# Patient Record
Sex: MALE | Race: ASIAN | Employment: FULL TIME
[De-identification: names, ages, dates, MRNs, and addresses within clinical notes are randomized per-mention and may not be internally consistent; named-entity substitution may affect disease eponyms.]

---

## 2024-01-04 ENCOUNTER — OFFICE VISIT (OUTPATIENT)
Dept: FAMILY MEDICINE CLINIC | Facility: CLINIC | Age: 34
End: 2024-01-04

## 2024-01-04 VITALS
HEART RATE: 69 BPM | DIASTOLIC BLOOD PRESSURE: 72 MMHG | BODY MASS INDEX: 26.96 KG/M2 | HEIGHT: 69 IN | TEMPERATURE: 98.2 F | WEIGHT: 182 LBS | SYSTOLIC BLOOD PRESSURE: 114 MMHG

## 2024-01-04 DIAGNOSIS — Z00.00 ANNUAL PHYSICAL EXAM: Primary | ICD-10-CM

## 2024-01-04 DIAGNOSIS — K62.5 RECTAL BLEEDING: ICD-10-CM

## 2024-01-04 DIAGNOSIS — B07.8 COMMON WART: ICD-10-CM

## 2024-01-04 PROCEDURE — 99395 PREV VISIT EST AGE 18-39: CPT | Performed by: NURSE PRACTITIONER

## 2024-01-04 RX ORDER — ASCORBIC ACID 500 MG
500 TABLET ORAL DAILY
COMMUNITY

## 2024-01-04 SDOH — ECONOMIC STABILITY: FOOD INSECURITY: WITHIN THE PAST 12 MONTHS, THE FOOD YOU BOUGHT JUST DIDN'T LAST AND YOU DIDN'T HAVE MONEY TO GET MORE.: NEVER TRUE

## 2024-01-04 SDOH — ECONOMIC STABILITY: HOUSING INSECURITY
IN THE LAST 12 MONTHS, WAS THERE A TIME WHEN YOU DID NOT HAVE A STEADY PLACE TO SLEEP OR SLEPT IN A SHELTER (INCLUDING NOW)?: NO

## 2024-01-04 SDOH — ECONOMIC STABILITY: INCOME INSECURITY: HOW HARD IS IT FOR YOU TO PAY FOR THE VERY BASICS LIKE FOOD, HOUSING, MEDICAL CARE, AND HEATING?: NOT HARD AT ALL

## 2024-01-04 SDOH — ECONOMIC STABILITY: FOOD INSECURITY: WITHIN THE PAST 12 MONTHS, YOU WORRIED THAT YOUR FOOD WOULD RUN OUT BEFORE YOU GOT MONEY TO BUY MORE.: NEVER TRUE

## 2024-01-04 ASSESSMENT — ENCOUNTER SYMPTOMS
ABDOMINAL PAIN: 1
CONSTIPATION: 0
COUGH: 0
SINUS PRESSURE: 0
ANAL BLEEDING: 1
BLOOD IN STOOL: 0
EYE PAIN: 0
RECTAL PAIN: 0
SORE THROAT: 0
SHORTNESS OF BREATH: 0
TROUBLE SWALLOWING: 0
VOMITING: 0
WHEEZING: 0
SINUS PAIN: 0
BACK PAIN: 1
RHINORRHEA: 0
ABDOMINAL DISTENTION: 0
DIARRHEA: 0
PHOTOPHOBIA: 0
NAUSEA: 0
CHOKING: 0
EYE REDNESS: 0
VOICE CHANGE: 0

## 2024-01-04 ASSESSMENT — PATIENT HEALTH QUESTIONNAIRE - PHQ9
SUM OF ALL RESPONSES TO PHQ9 QUESTIONS 1 & 2: 0
2. FEELING DOWN, DEPRESSED OR HOPELESS: 0
SUM OF ALL RESPONSES TO PHQ QUESTIONS 1-9: 0
1. LITTLE INTEREST OR PLEASURE IN DOING THINGS: 0
SUM OF ALL RESPONSES TO PHQ QUESTIONS 1-9: 0

## 2024-01-04 NOTE — PROGRESS NOTES
concentration and sleep disturbance. The patient is not nervous/anxious and is not hyperactive.        Objective:   Physical Exam  Vitals and nursing note reviewed. Exam conducted with a chaperone present.   Constitutional:       Appearance: Normal appearance. He is normal weight.   HENT:      Head: Normocephalic.      Right Ear: Tympanic membrane, ear canal and external ear normal.      Left Ear: Tympanic membrane, ear canal and external ear normal.      Nose: Nose normal.      Mouth/Throat:      Mouth: Mucous membranes are moist.   Eyes:      Extraocular Movements: Extraocular movements intact.      Conjunctiva/sclera: Conjunctivae normal.      Pupils: Pupils are equal, round, and reactive to light.   Cardiovascular:      Rate and Rhythm: Normal rate and regular rhythm.      Pulses: Normal pulses.      Heart sounds: Normal heart sounds.   Pulmonary:      Effort: Pulmonary effort is normal.      Breath sounds: Normal breath sounds.   Abdominal:      General: Abdomen is flat. Bowel sounds are normal.   Genitourinary:     Rectum: Normal. Guaiac result negative.   Musculoskeletal:         General: Normal range of motion.      Cervical back: Normal range of motion.   Lymphadenopathy:      Cervical: No cervical adenopathy.   Skin:     General: Skin is warm and dry.      Capillary Refill: Capillary refill takes less than 2 seconds.   Neurological:      General: No focal deficit present.      Mental Status: He is alert and oriented to person, place, and time.   Psychiatric:         Mood and Affect: Mood normal.         Behavior: Behavior normal.         Thought Content: Thought content normal.         Judgment: Judgment normal.     Common warts on both hands    Assessment:      /72 (Site: Right Upper Arm, Position: Sitting, Cuff Size: Large Adult)   Pulse 69   Temp 98.2 °F (36.8 °C) (Temporal)   Ht 1.753 m (5' 9\")   Wt 82.6 kg (182 lb)   BMI 26.88 kg/m²        Plan:      1. Annual physical exam  -     Lipid

## 2024-04-03 ENCOUNTER — OFFICE VISIT (OUTPATIENT)
Dept: FAMILY MEDICINE CLINIC | Facility: CLINIC | Age: 34
End: 2024-04-03
Payer: COMMERCIAL

## 2024-04-03 VITALS
HEIGHT: 69 IN | SYSTOLIC BLOOD PRESSURE: 121 MMHG | WEIGHT: 184 LBS | HEART RATE: 69 BPM | TEMPERATURE: 98.2 F | BODY MASS INDEX: 27.25 KG/M2 | DIASTOLIC BLOOD PRESSURE: 73 MMHG

## 2024-04-03 DIAGNOSIS — K62.5 RECTAL BLEEDING: Primary | ICD-10-CM

## 2024-04-03 DIAGNOSIS — K62.5 RECTAL BLEEDING: ICD-10-CM

## 2024-04-03 LAB
BASOPHILS # BLD: 0 K/UL (ref 0–0.2)
BASOPHILS NFR BLD: 1 % (ref 0–2)
DIFFERENTIAL METHOD BLD: ABNORMAL
EOSINOPHIL # BLD: 0.1 K/UL (ref 0–0.8)
EOSINOPHIL NFR BLD: 3 % (ref 0.5–7.8)
ERYTHROCYTE [DISTWIDTH] IN BLOOD BY AUTOMATED COUNT: 12.3 % (ref 11.9–14.6)
ERYTHROCYTE [SEDIMENTATION RATE] IN BLOOD: 2 MM/HR (ref 0–20)
HCT VFR BLD AUTO: 46.9 % (ref 41.1–50.3)
HGB BLD-MCNC: 15.7 G/DL (ref 13.6–17.2)
IMM GRANULOCYTES # BLD AUTO: 0 K/UL (ref 0–0.5)
IMM GRANULOCYTES NFR BLD AUTO: 1 % (ref 0–5)
LYMPHOCYTES # BLD: 2.1 K/UL (ref 0.5–4.6)
LYMPHOCYTES NFR BLD: 39 % (ref 13–44)
MCH RBC QN AUTO: 31.2 PG (ref 26.1–32.9)
MCHC RBC AUTO-ENTMCNC: 33.5 G/DL (ref 31.4–35)
MCV RBC AUTO: 93.1 FL (ref 82–102)
MONOCYTES # BLD: 0.5 K/UL (ref 0.1–1.3)
MONOCYTES NFR BLD: 9 % (ref 4–12)
NEUTS SEG # BLD: 2.6 K/UL (ref 1.7–8.2)
NEUTS SEG NFR BLD: 47 % (ref 43–78)
NRBC # BLD: 0 K/UL (ref 0–0.2)
PLATELET # BLD AUTO: 235 K/UL (ref 150–450)
PMV BLD AUTO: 9 FL (ref 9.4–12.3)
RBC # BLD AUTO: 5.04 M/UL (ref 4.23–5.6)
WBC # BLD AUTO: 5.4 K/UL (ref 4.3–11.1)

## 2024-04-03 PROCEDURE — 99214 OFFICE O/P EST MOD 30 MIN: CPT | Performed by: NURSE PRACTITIONER

## 2024-04-03 ASSESSMENT — ENCOUNTER SYMPTOMS
BLOOD IN STOOL: 1
RECTAL PAIN: 0
NAUSEA: 0
CONSTIPATION: 0
ABDOMINAL PAIN: 1
DIARRHEA: 0
VOMITING: 0

## 2024-04-03 NOTE — PROGRESS NOTES
Subjective:      Patient ID: Mao Perkins is a 34 y.o. male.   He has noted bright red stool in stool for the past 2 weeks. It is in the toilet bowl and on the to.  Is not painful to defecate. Never any pain.  No hard stools.  Some mucous at times. No weight loss no  fever  no abdominal pain but uncomfortable at times.  No change in BM  Here with wife who serves as   He admits to increased alcohol and has drinking a bottle ow wine for the past 2 weeks. He stopped drinking 3 days ago with some improvement in rectal bleeding.   HPI    Review of Systems   Constitutional:  Negative for chills, fatigue, fever and unexpected weight change.   Gastrointestinal:  Positive for abdominal pain and blood in stool. Negative for constipation, diarrhea, nausea, rectal pain and vomiting.       Objective:   Physical Exam  Vitals and nursing note reviewed.   Constitutional:       Appearance: Normal appearance. He is normal weight.      Comments: Muscular    Eyes:      Conjunctiva/sclera: Conjunctivae normal.   Cardiovascular:      Rate and Rhythm: Normal rate and regular rhythm.      Pulses: Normal pulses.      Heart sounds: Normal heart sounds.   Pulmonary:      Effort: Pulmonary effort is normal.      Breath sounds: Normal breath sounds.   Abdominal:      Tenderness: There is abdominal tenderness (r and l lower qudarant tenderness).   Genitourinary:     Rectum: Normal. Guaiac result negative.   Skin:     General: Skin is warm and dry.   Neurological:      Mental Status: He is alert.         Assessment:      /73 (Site: Right Upper Arm, Position: Sitting, Cuff Size: Large Adult)   Pulse 69   Temp 98.2 °F (36.8 °C) (Temporal)   Ht 1.753 m (5' 9\")   Wt 83.5 kg (184 lb)   BMI 27.17 kg/m²        Plan:    1. Rectal bleeding  -     [facility]  -     CBC with Auto Differential; Future  -     Comprehensive Metabolic Panel; Future  -     Sedimentation Rate; Future   Stop alcohol checking

## 2024-04-04 ENCOUNTER — TELEPHONE (OUTPATIENT)
Dept: FAMILY MEDICINE CLINIC | Facility: CLINIC | Age: 34
End: 2024-04-04

## 2024-04-04 LAB
ALBUMIN SERPL-MCNC: 4.1 G/DL (ref 3.5–5)
ALBUMIN/GLOB SERPL: 1.2 (ref 0.4–1.6)
ALP SERPL-CCNC: 43 U/L (ref 50–136)
ALT SERPL-CCNC: 40 U/L (ref 12–65)
ANION GAP SERPL CALC-SCNC: 4 MMOL/L (ref 2–11)
AST SERPL-CCNC: 22 U/L (ref 15–37)
BILIRUB SERPL-MCNC: 0.5 MG/DL (ref 0.2–1.1)
BUN SERPL-MCNC: 14 MG/DL (ref 6–23)
CALCIUM SERPL-MCNC: 9.1 MG/DL (ref 8.3–10.4)
CHLORIDE SERPL-SCNC: 106 MMOL/L (ref 103–113)
CO2 SERPL-SCNC: 27 MMOL/L (ref 21–32)
CREAT SERPL-MCNC: 0.9 MG/DL (ref 0.8–1.5)
GLOBULIN SER CALC-MCNC: 3.3 G/DL (ref 2.8–4.5)
GLUCOSE SERPL-MCNC: 91 MG/DL (ref 65–100)
POTASSIUM SERPL-SCNC: 4.2 MMOL/L (ref 3.5–5.1)
PROT SERPL-MCNC: 7.4 G/DL (ref 6.3–8.2)
SODIUM SERPL-SCNC: 137 MMOL/L (ref 136–146)

## 2024-04-04 NOTE — TELEPHONE ENCOUNTER
----- Message from LAURA Nur NP sent at 4/4/2024  7:42 AM EDT -----  Good news labs are normal but keep gi apt

## 2024-04-28 NOTE — PROGRESS NOTES
Mao Perkins (:  1990) is a 34 y.o. male new patient referred to our office for evaluation of the following chief complaint(s):  New Patient        Assessment & Plan   ASSESSMENT/PLAN:  1. BRBPR (bright red blood per rectum)  2. Abdominal bloating  3. Epigastric discomfort    -New onset BRBPR without any bowel habit changes. Denies hx of hemorrhoids or anal fissure. Reportedly had normal JAVI by PCP and he would like to pursue colonoscopy. Will schedule.   -A/w some epigastric discomfort and moved from [place] 10 years ago with no prior EGD surveillance or evaluation. Higher risk of gastric cancer noted in this population. Will schedule EGD.     Subjective   SUBJECTIVE/OBJECTIVE  Mao Perkins is a 34 y.o. year old male who denies PMH or abdominal surgeries.    Patient was referred to our office for evaluation of rectal bleeding. Patient c/o 2 weeks of BRBPR in toilet bowl and on toilet paper. Noted to have increased alcohol intake. Referral note reviewed from 4/3/2024. Same-day labs showed normal CBC, CMP, ESR.     Today presents with his wife Siomara who translates for patient. He reports a month ago he had a 10 day period of BRBPR in the toilet bowl. He describes quite a lot of blood. It seemed to resolve but then recurred a few days ago, less volume. Denies any prior similar episodes. Denies constipation, hard, painful stools, straining, diarrhea, hemorrhoids. He reports some associated lower abdominal discomfort with exam but otherwise denies abdominal pain. Denies nausea, vomiting, fever, chills. He says his PCP did a rectal exam that was negative for hemorrhoids and he would like have a colonoscopy done. He also describes epigastric bloating and discomfort. He moved from [place] 10 years ago.       Denies prior GI evaluation or endoscopy. Patient denies family hx of GI malignancy or IBD. He smokes 2 packs per week. He drinks 1 bottle of wine weekly. Denies OAC or NSAID use.     History reviewed. No pertinent past

## 2024-04-29 ENCOUNTER — OFFICE VISIT (OUTPATIENT)
Age: 34
End: 2024-04-29
Payer: COMMERCIAL

## 2024-04-29 ENCOUNTER — PREP FOR PROCEDURE (OUTPATIENT)
Dept: GASTROENTEROLOGY | Age: 34
End: 2024-04-29

## 2024-04-29 VITALS
OXYGEN SATURATION: 98 % | RESPIRATION RATE: 17 BRPM | DIASTOLIC BLOOD PRESSURE: 83 MMHG | SYSTOLIC BLOOD PRESSURE: 133 MMHG | HEIGHT: 69 IN | BODY MASS INDEX: 27.7 KG/M2 | HEART RATE: 70 BPM | WEIGHT: 187 LBS

## 2024-04-29 DIAGNOSIS — K62.5 BRBPR (BRIGHT RED BLOOD PER RECTUM): Primary | ICD-10-CM

## 2024-04-29 DIAGNOSIS — R10.13 EPIGASTRIC PAIN: ICD-10-CM

## 2024-04-29 DIAGNOSIS — R14.0 ABDOMINAL BLOATING: ICD-10-CM

## 2024-04-29 DIAGNOSIS — K62.5 BRBPR (BRIGHT RED BLOOD PER RECTUM): ICD-10-CM

## 2024-04-29 DIAGNOSIS — R10.13 EPIGASTRIC DISCOMFORT: ICD-10-CM

## 2024-04-29 PROCEDURE — 99204 OFFICE O/P NEW MOD 45 MIN: CPT | Performed by: PHYSICIAN ASSISTANT

## 2024-05-02 RX ORDER — SODIUM CHLORIDE 0.9 % (FLUSH) 0.9 %
5-40 SYRINGE (ML) INJECTION EVERY 12 HOURS SCHEDULED
Status: CANCELLED | OUTPATIENT
Start: 2024-05-02

## 2024-05-02 RX ORDER — SODIUM CHLORIDE 9 MG/ML
25 INJECTION, SOLUTION INTRAVENOUS PRN
Status: CANCELLED | OUTPATIENT
Start: 2024-05-02

## 2024-05-02 RX ORDER — SODIUM CHLORIDE 0.9 % (FLUSH) 0.9 %
5-40 SYRINGE (ML) INJECTION PRN
Status: CANCELLED | OUTPATIENT
Start: 2024-05-02

## 2024-05-10 NOTE — PERIOP NOTE
Patient verified name, , and procedure.    Type: 1a; abbreviated assessment per anesthesia guidelines    Labs per anesthesia: none    Instructed pt that they will be notified the day before their procedure by the GI Lab for time of arrival if their procedure is [place] and Pre-op for [place] cases. Arrival times should be called by 5 pm. If no phone is received the patient should contact their respective hospital. The GI lab telephone number is [phone] and ES Pre-op is [phone].     Follow diet and prep instructions per office including NPO status.  If patient has NOT received instructions from office patient is advised to call surgeon office, verbalizes understanding.    Bath or shower the night before and the am of surgery with non-moisturizing soap. No lotions, oils, powders, cologne on skin. No make up, eye make up or jewelry. Wear loose fitting comfortable, clean clothing.     Must have adult present in building the entire time .     Medications for the day of procedure none, patient to hold vitamins and supplements per anesthesia guidelines.     The following discharge instructions reviewed with patient: medication given during procedure may cause drowsiness for several hours, therefore, do not drive or operate machinery for remainder of the day. You may not drink alcohol on the day of your procedure, please resume regular diet and activity unless otherwise directed. You may experience abdominal distention for several hours that is relieved by the passage of gas. Contact your physician if you have any of the following: fever or chills, severe abdominal pain or excessive amount of bleeding or a large amount when having a bowel movement. Occasional specks of blood with bowel movement would not be unusual.

## 2024-05-10 NOTE — PROGRESS NOTES
RN called Pt to confirm appointment time of 1120, arrival time 945, location,  requirement, and instructions for registration at the hospital.  Pt verbalized understanding.

## 2024-05-12 ENCOUNTER — ANESTHESIA EVENT (OUTPATIENT)
Dept: ENDOSCOPY | Age: 34
End: 2024-05-12
Payer: COMMERCIAL

## 2024-05-12 RX ORDER — PROCHLORPERAZINE EDISYLATE 5 MG/ML
5 INJECTION INTRAMUSCULAR; INTRAVENOUS
Status: CANCELLED | OUTPATIENT
Start: 2024-05-12 | End: 2024-05-13

## 2024-05-12 RX ORDER — ONDANSETRON 2 MG/ML
4 INJECTION INTRAMUSCULAR; INTRAVENOUS
Status: CANCELLED | OUTPATIENT
Start: 2024-05-12 | End: 2024-05-13

## 2024-05-12 RX ORDER — SODIUM CHLORIDE, SODIUM LACTATE, POTASSIUM CHLORIDE, CALCIUM CHLORIDE 600; 310; 30; 20 MG/100ML; MG/100ML; MG/100ML; MG/100ML
INJECTION, SOLUTION INTRAVENOUS CONTINUOUS
Status: CANCELLED | OUTPATIENT
Start: 2024-05-12

## 2024-05-12 RX ORDER — LIDOCAINE HYDROCHLORIDE 10 MG/ML
1 INJECTION, SOLUTION INFILTRATION; PERINEURAL
Status: CANCELLED | OUTPATIENT
Start: 2024-05-12 | End: 2024-05-13

## 2024-05-12 RX ORDER — NALOXONE HYDROCHLORIDE 0.4 MG/ML
INJECTION, SOLUTION INTRAMUSCULAR; INTRAVENOUS; SUBCUTANEOUS PRN
Status: CANCELLED | OUTPATIENT
Start: 2024-05-12

## 2024-05-13 ENCOUNTER — ANESTHESIA (OUTPATIENT)
Dept: ENDOSCOPY | Age: 34
End: 2024-05-13
Payer: COMMERCIAL

## 2024-05-13 ENCOUNTER — HOSPITAL ENCOUNTER (OUTPATIENT)
Age: 34
Setting detail: OUTPATIENT SURGERY
Discharge: HOME | End: 2024-05-13
Attending: STUDENT IN AN ORGANIZED HEALTH CARE EDUCATION/TRAINING PROGRAM | Admitting: STUDENT IN AN ORGANIZED HEALTH CARE EDUCATION/TRAINING PROGRAM
Payer: COMMERCIAL

## 2024-05-13 VITALS
HEIGHT: 70 IN | WEIGHT: 185 LBS | HEART RATE: 54 BPM | TEMPERATURE: 97 F | OXYGEN SATURATION: 98 % | BODY MASS INDEX: 26.48 KG/M2 | RESPIRATION RATE: 16 BRPM | SYSTOLIC BLOOD PRESSURE: 116 MMHG | DIASTOLIC BLOOD PRESSURE: 57 MMHG

## 2024-05-13 DIAGNOSIS — K25.9 MULTIPLE GASTRIC ULCERS: Primary | ICD-10-CM

## 2024-05-13 PROCEDURE — 7100000011 HC PHASE II RECOVERY - ADDTL 15 MIN: Performed by: STUDENT IN AN ORGANIZED HEALTH CARE EDUCATION/TRAINING PROGRAM

## 2024-05-13 PROCEDURE — 2500000003 HC RX 250 WO HCPCS: Performed by: NURSE ANESTHETIST, CERTIFIED REGISTERED

## 2024-05-13 PROCEDURE — 88312 SPECIAL STAINS GROUP 1: CPT

## 2024-05-13 PROCEDURE — 2709999900 HC NON-CHARGEABLE SUPPLY: Performed by: STUDENT IN AN ORGANIZED HEALTH CARE EDUCATION/TRAINING PROGRAM

## 2024-05-13 PROCEDURE — 3609012400 HC EGD TRANSORAL BIOPSY SINGLE/MULTIPLE: Performed by: STUDENT IN AN ORGANIZED HEALTH CARE EDUCATION/TRAINING PROGRAM

## 2024-05-13 PROCEDURE — 88305 TISSUE EXAM BY PATHOLOGIST: CPT

## 2024-05-13 PROCEDURE — 3700000000 HC ANESTHESIA ATTENDED CARE: Performed by: STUDENT IN AN ORGANIZED HEALTH CARE EDUCATION/TRAINING PROGRAM

## 2024-05-13 PROCEDURE — 2580000003 HC RX 258: Performed by: ANESTHESIOLOGY

## 2024-05-13 PROCEDURE — 7100000010 HC PHASE II RECOVERY - FIRST 15 MIN: Performed by: STUDENT IN AN ORGANIZED HEALTH CARE EDUCATION/TRAINING PROGRAM

## 2024-05-13 PROCEDURE — 6360000002 HC RX W HCPCS: Performed by: NURSE ANESTHETIST, CERTIFIED REGISTERED

## 2024-05-13 PROCEDURE — 3700000001 HC ADD 15 MINUTES (ANESTHESIA): Performed by: STUDENT IN AN ORGANIZED HEALTH CARE EDUCATION/TRAINING PROGRAM

## 2024-05-13 PROCEDURE — 3609010700 HC COLONOSCOPY POLYPECTOMY REMOVAL SNARE/STOMA: Performed by: STUDENT IN AN ORGANIZED HEALTH CARE EDUCATION/TRAINING PROGRAM

## 2024-05-13 RX ORDER — SODIUM CHLORIDE, SODIUM LACTATE, POTASSIUM CHLORIDE, CALCIUM CHLORIDE 600; 310; 30; 20 MG/100ML; MG/100ML; MG/100ML; MG/100ML
INJECTION, SOLUTION INTRAVENOUS CONTINUOUS
Status: DISCONTINUED | OUTPATIENT
Start: 2024-05-13 | End: 2024-05-13 | Stop reason: HOSPADM

## 2024-05-13 RX ORDER — SODIUM CHLORIDE 9 MG/ML
25 INJECTION, SOLUTION INTRAVENOUS PRN
Status: DISCONTINUED | OUTPATIENT
Start: 2024-05-13 | End: 2024-05-13 | Stop reason: HOSPADM

## 2024-05-13 RX ORDER — SODIUM CHLORIDE 0.9 % (FLUSH) 0.9 %
5-40 SYRINGE (ML) INJECTION PRN
Status: DISCONTINUED | OUTPATIENT
Start: 2024-05-13 | End: 2024-05-13 | Stop reason: HOSPADM

## 2024-05-13 RX ORDER — ONDANSETRON 2 MG/ML
4 INJECTION INTRAMUSCULAR; INTRAVENOUS
Status: DISCONTINUED | OUTPATIENT
Start: 2024-05-13 | End: 2024-05-13 | Stop reason: HOSPADM

## 2024-05-13 RX ORDER — OMEPRAZOLE 40 MG/1
40 CAPSULE, DELAYED RELEASE ORAL
Qty: 90 CAPSULE | Refills: 2 | Status: SHIPPED | OUTPATIENT
Start: 2024-05-13

## 2024-05-13 RX ORDER — SODIUM CHLORIDE 0.9 % (FLUSH) 0.9 %
5-40 SYRINGE (ML) INJECTION EVERY 12 HOURS SCHEDULED
Status: DISCONTINUED | OUTPATIENT
Start: 2024-05-13 | End: 2024-05-13 | Stop reason: HOSPADM

## 2024-05-13 RX ORDER — SODIUM CHLORIDE 9 MG/ML
INJECTION, SOLUTION INTRAVENOUS PRN
Status: DISCONTINUED | OUTPATIENT
Start: 2024-05-13 | End: 2024-05-13 | Stop reason: HOSPADM

## 2024-05-13 RX ORDER — PROPOFOL 10 MG/ML
INJECTION, EMULSION INTRAVENOUS PRN
Status: DISCONTINUED | OUTPATIENT
Start: 2024-05-13 | End: 2024-05-13 | Stop reason: SDUPTHER

## 2024-05-13 RX ORDER — LIDOCAINE HYDROCHLORIDE 20 MG/ML
INJECTION, SOLUTION EPIDURAL; INFILTRATION; INTRACAUDAL; PERINEURAL PRN
Status: DISCONTINUED | OUTPATIENT
Start: 2024-05-13 | End: 2024-05-13 | Stop reason: SDUPTHER

## 2024-05-13 RX ADMIN — SODIUM CHLORIDE, POTASSIUM CHLORIDE, SODIUM LACTATE AND CALCIUM CHLORIDE: 600; 310; 30; 20 INJECTION, SOLUTION INTRAVENOUS at 10:43

## 2024-05-13 RX ADMIN — LIDOCAINE HYDROCHLORIDE 100 MG: 20 INJECTION, SOLUTION EPIDURAL; INFILTRATION; INTRACAUDAL; PERINEURAL at 11:18

## 2024-05-13 RX ADMIN — PROPOFOL 80 MG: 10 INJECTION, EMULSION INTRAVENOUS at 11:18

## 2024-05-13 RX ADMIN — PROPOFOL 30 MG: 10 INJECTION, EMULSION INTRAVENOUS at 11:31

## 2024-05-13 RX ADMIN — PROPOFOL 30 MG: 10 INJECTION, EMULSION INTRAVENOUS at 11:21

## 2024-05-13 RX ADMIN — PROPOFOL 40 MG: 10 INJECTION, EMULSION INTRAVENOUS at 11:24

## 2024-05-13 RX ADMIN — PROPOFOL 200 MCG/KG/MIN: 10 INJECTION, EMULSION INTRAVENOUS at 11:19

## 2024-05-13 RX ADMIN — PROPOFOL 20 MG: 10 INJECTION, EMULSION INTRAVENOUS at 11:20

## 2024-05-13 ASSESSMENT — PAIN - FUNCTIONAL ASSESSMENT: PAIN_FUNCTIONAL_ASSESSMENT: 0-10

## 2024-05-13 NOTE — ANESTHESIA POSTPROCEDURE EVALUATION
Department of Anesthesiology  Postprocedure Note    Patient: Mao Perkins  MRN: [id number]  YOB: 1990  Date of evaluation: 5/13/2024    Procedure Summary       Date: 05/13/24 Room / Location: [facility] 04 / [facility] ENDOSCOPY    Anesthesia Start: 1114 Anesthesia Stop: 1143    Procedures:       COLONOSCOPY POLYPECTOMY REMOVAL SNARE      ESOPHAGOGASTRODUODENOSCOPY BIOPSY (Upper GI Region) Diagnosis:       BRBPR (bright red blood per rectum)      Abdominal bloating      Epigastric pain      (BRBPR (bright red blood per rectum) [K62.5])      (Abdominal bloating [R14.0])      (Epigastric pain [R10.13])    Surgeons: Bull West MD Responsible Provider: Eleazar Linares MD    Anesthesia Type: TIVA ASA Status: 2            Anesthesia Type: TIVA    Norberto Phase I: Norberto Score: 10    Norberto Phase II: Norberto Score: 5    Anesthesia Post Evaluation    Patient location during evaluation: PACU  Patient participation: complete - patient participated  Level of consciousness: sleepy but conscious  Airway patency: patent  Nausea & Vomiting: no nausea and no vomiting  Cardiovascular status: blood pressure returned to baseline and hemodynamically stable  Respiratory status: acceptable  Hydration status: euvolemic  There was medical reason for not using a multimodal analgesia pain management approach.Pain management: adequate    No notable events documented.

## 2024-05-13 NOTE — DISCHARGE INSTRUCTIONS
Gastrointestinal Esophagogastroduodenoscopy (EGD) and Colonoscopy- Discharge Instructions    1. Call Dr. West  at  for any problems or questions.    2. Contact the doctor's office for follow up appointment as directed.    3. Medication may cause drowsiness for several hours, therefore, do not drive or operate machinery for remainder of the day.    4. No alcohol today.    5. Do not make any important decisions such as signing legal paperwork.    6. Ordinarily, you may resume regular diet and activity after exam unless otherwise specified by your physician.    7. For mild soreness in your throat you may use Cepacol throat lozenges or warm  salt-water gargles as needed.    8. Because of air put into your colon during exam, you may experience some abdominal distension, relieved by the passage of gas, for several hours.    9. Contact your physician if you have any of the following:  Excessive amount of bleeding - large amount when having a bowel movement.  Occasional specks of blood with bowel movement would not be unusual.  Severe abdominal pain  Fever or Chills    10. Polyp Removal - follow these additional instructions    No Aspirin, Advil, Aleve, Nuprin, Ibuprofen, or medications that contain these drugs for 2 weeks, unless taken for a heart condition.    Any additional instructions:   Use omeprazole 40 mg twice daily (30 minutes before your breakfast and 30 minutes before your dinner) for 8 weeks followed by omeprazole 40 mg daily.  Follow-up with Diana in 4 weeks.  Await for biopsy results, you will receive a pathology letter within 2 weeks.

## 2024-05-13 NOTE — H&P
Expand All Collapse All    Mao Perkins (:  1990) is a 34 y.o. male new patient referred to our office for evaluation of the following chief complaint(s):  New Patient              Assessment & Plan  ASSESSMENT/PLAN:  1. BRBPR (bright red blood per rectum)  2. Abdominal bloating  3. Epigastric discomfort     -New onset BRBPR without any bowel habit changes. Denies hx of hemorrhoids or anal fissure. Reportedly had normal JAVI by PCP and he would like to pursue colonoscopy. Will schedule.   -A/w some epigastric discomfort and moved from [place] 10 years ago with no prior EGD surveillance or evaluation. Higher risk of gastric cancer noted in this population. Will schedule EGD.            Subjective   SUBJECTIVE/OBJECTIVE  Mao Perkins is a 34 y.o. year old male who denies PMH or abdominal surgeries.     Patient was referred to our office for evaluation of rectal bleeding. Patient c/o 2 weeks of BRBPR in toilet bowl and on toilet paper. Noted to have increased alcohol intake. Referral note reviewed from 4/3/2024. Same-day labs showed normal CBC, CMP, ESR.      Today presents with his wife Siomara who translates for patient. He reports a month ago he had a 10 day period of BRBPR in the toilet bowl. He describes quite a lot of blood. It seemed to resolve but then recurred a few days ago, less volume. Denies any prior similar episodes. Denies constipation, hard, painful stools, straining, diarrhea, hemorrhoids. He reports some associated lower abdominal discomfort with exam but otherwise denies abdominal pain. Denies nausea, vomiting, fever, chills. He says his PCP did a rectal exam that was negative for hemorrhoids and he would like have a colonoscopy done. He also describes epigastric bloating and discomfort. He moved from [place] 10 years ago.        Denies prior GI evaluation or endoscopy. Patient denies family hx of GI malignancy or IBD. He smokes 2 packs per week. He drinks 1 bottle of wine weekly. Denies OAC or NSAID use.

## 2024-05-13 NOTE — OP NOTE
Operative Report    Patient: Mao Perkins MRN: [id number]      YOB: 1990  Age: 34 y.o.  Sex: male            Indications: Bright red blood per rectum    Preoperative Evaluation: The patient was evaluated prior to the procedure in the GI lab admission area, the patient ASA was recorded .  Consent was obtained from the patient with the risk of perforation bleeding and aspiration.    Anesthesia: ASHKAN-per anesthesia    Complications: None; patient tolerated the procedure well.    EBL -insignificant      Procedure: The patient was sedated in the left lateral decubitus position.  Scope was advanced from the rectum to the ileum.   The scope was withdrawn to the rectum, retroflexed view was performed.  The rectal exam was normal.  Preparation was adequate. [place] score of 9 .    Findings:    Normal terminal ileum.  There was a polyp in the transverse colon, 5 mm in size, removed via cold snare polypectomy  Otherwise unremarkable colon  Internal hemorrhoids    Postoperative Diagnosis:   1 polyp  Internal hemorrhoids    Recommendations:   Interval of the next colonoscopy will be determined by pending pathology, 5 years versus repeat colonoscopy at age 46)  Await for biopsy results, you will receive a pathology letter within 2 weeks.     Signed By:  Bull West MD     May 13, 2024                         benjamin all pertinent systems normal

## 2024-05-13 NOTE — ANESTHESIA PRE PROCEDURE
Department of Anesthesiology  Preprocedure Note       Name:  Mao Perkins   Age:  34 y.o.  :  1990                                          MRN:  [id number]         Date:  2024      Surgeon: Surgeon(s):  Bull West MD    Procedure: Procedure(s):  COLORECTAL CANCER SCREENING, NOT HIGH RISK  ESOPHAGOGASTRODUODENOSCOPY Interviewed    Medications prior to admission:   Prior to Admission medications    Medication Sig Start Date End Date Taking? Authorizing Provider   vitamin C (ASCORBIC ACID) 500 MG tablet Take 1 tablet by mouth daily    ProviderTj MD   VITAMIN D PO Take by mouth daily    Tj Mathew MD       Current medications:    Current Facility-Administered Medications   Medication Dose Route Frequency Provider Last Rate Last Admin   • ondansetron (ZOFRAN) injection 4 mg  4 mg IntraVENous Once PRN Eleazar Linares MD       • lactated ringers IV soln infusion   IntraVENous Continuous Eleazar Linares MD       • sodium chloride flush 0.9 % injection 5-40 mL  5-40 mL IntraVENous 2 times per day Eleazar Linares MD       • sodium chloride flush 0.9 % injection 5-40 mL  5-40 mL IntraVENous PRN Eleazar Linares MD       • 0.9 % sodium chloride infusion   IntraVENous PRN Eleazar Linares MD       • sodium chloride flush 0.9 % injection 5-40 mL  5-40 mL IntraVENous 2 times per day Bull West MD       • sodium chloride flush 0.9 % injection 5-40 mL  5-40 mL IntraVENous PRN Bull West MD       • 0.9 % sodium chloride infusion  25 mL IntraVENous PRN Bull West MD           Allergies:  No Known Allergies    Problem List:    Patient Active Problem List   Diagnosis Code   • BRBPR (bright red blood per rectum) K62.5   • Abdominal bloating R14.0   • Epigastric pain R10.13       Past Medical History:        Diagnosis Date   • Abdominal bloating    • BRBPR (bright red blood per rectum)    • Epigastric pain        Past Surgical History:        Procedure Laterality Date   • REFRACTIVE

## 2024-05-13 NOTE — FLOWSHEET NOTE
05/13/24 1044    Services   Do you speak English? Well    Used No      waiver signed by patient, spouse Siomara is interpreting for the patient per patient request.

## 2024-05-13 NOTE — OP NOTE
Endoscopy Note          Operative Report    Patient: Mao Perkins MRN: [id number]      YOB: 1990  Age: 34 y.o.  Sex: male            Indications:  Abd pain    Preoperative Evaluation: The patient was evaluated prior to the procedure in the GI lab admission area, the patient ASA was recorded .  Consent was obtained from the patient with the risk of perforation bleeding and aspiration.    Anesthesia: ASHKAN-per anesthesia  Complications: None  EBL: Unremarkable  Procedure: The patient was sedated in the left lateral decubitus position. Scope was advance from the mouth to the third portion of the duodenum. The scope was withdrawn to the stomach and a refroflexed view was performed.     Findings:  Normal esophagus  Clean-based 2 gastric ulcers in the antrum along with gastritis.  Biopsies obtained for H. pylori.  Multiple small ulcers in the first portion of the duodenum, normal-appearing second portion of the duodenum.  Multiple small bowel biopsies obtained.    Postoperative Diagnosis:  Gastric and small bowel ulcers.    Recommendations:   Use omeprazole 40 mg twice daily (30 minutes before your breakfast and 30 minutes before your dinner) for 8 weeks followed by omeprazole 40 mg daily.  Follow-up with Diana in 4 weeks.  Await for biopsy results, you will receive a pathology letter within 2 weeks.     Signed By:  Bull West MD     May 13, 2024

## 2024-05-20 DIAGNOSIS — A04.8 H. PYLORI INFECTION: Primary | ICD-10-CM

## 2024-05-20 RX ORDER — CLARITHROMYCIN 500 MG/1
500 TABLET, COATED ORAL 2 TIMES DAILY
Qty: 20 TABLET | Refills: 0 | Status: SHIPPED | OUTPATIENT
Start: 2024-05-20 | End: 2024-05-30

## 2024-05-20 RX ORDER — OMEPRAZOLE 40 MG/1
40 CAPSULE, DELAYED RELEASE ORAL
Qty: 20 CAPSULE | Refills: 0 | Status: SHIPPED | OUTPATIENT
Start: 2024-05-20 | End: 2024-05-30

## 2024-05-20 RX ORDER — AMOXICILLIN 500 MG/1
1000 CAPSULE ORAL 2 TIMES DAILY
Qty: 40 CAPSULE | Refills: 0 | Status: SHIPPED | OUTPATIENT
Start: 2024-05-20 | End: 2024-05-30

## 2024-05-24 ENCOUNTER — TELEPHONE (OUTPATIENT)
Age: 34
End: 2024-05-24

## 2024-05-24 NOTE — TELEPHONE ENCOUNTER
Called pt to review positive H. Pylori result and instructions for Triple Therapy per Melissa Grinnell, PA. Pt's wife answered phone. Pt's wife confirmed that pt had already picked up prescriptions for triple therapy per Diana and started taking them as prescribed. Briefly reviewed instructions for triple therapy and stool retest in 3 months while holding PPI 2 weeks prior. Pt's wife verbalized understanding. Informed pt's wife that RN had sent detailed [employer] message with instructions for pt to reference. Pt's wife verbalized understanding, agreeable to treatment plan per Diana.

## 2024-06-24 ENCOUNTER — OFFICE VISIT (OUTPATIENT)
Age: 34
End: 2024-06-24
Payer: COMMERCIAL

## 2024-06-24 VITALS
BODY MASS INDEX: 26.92 KG/M2 | DIASTOLIC BLOOD PRESSURE: 68 MMHG | SYSTOLIC BLOOD PRESSURE: 105 MMHG | WEIGHT: 188 LBS | RESPIRATION RATE: 17 BRPM | HEART RATE: 69 BPM | HEIGHT: 70 IN | OXYGEN SATURATION: 98 %

## 2024-06-24 DIAGNOSIS — B96.81 ULCER OF THE STOMACH AND INTESTINES DUE TO H. PYLORI: Primary | ICD-10-CM

## 2024-06-24 DIAGNOSIS — K63.5 HYPERPLASTIC POLYP OF TRANSVERSE COLON: ICD-10-CM

## 2024-06-24 DIAGNOSIS — K64.8 INTERNAL HEMORRHOIDS: ICD-10-CM

## 2024-06-24 DIAGNOSIS — K28.9 ULCER OF THE STOMACH AND INTESTINES DUE TO H. PYLORI: Primary | ICD-10-CM

## 2024-06-24 PROCEDURE — 99214 OFFICE O/P EST MOD 30 MIN: CPT | Performed by: PHYSICIAN ASSISTANT

## 2024-06-24 NOTE — PATIENT INSTRUCTIONS
For hemorrhoids:   Take Miralax 1 cap daily for constipation and titrate up or down as needed until having regular daily bowel movements. Increase daily fiber intake to 25-35 grams daily either by dietary intake or an over-the-counter psyllium husk fiber supplement. Limit alcohol and fatty food intake. Drink at least 80 oz water daily. Exercise regularly and consider weight loss if appropriate based on your current weight. Avoid straining or lingering on the toilet longer than necessary. Try scheduled toilet time approximately 30 minutes after your first drink or meal of the day. Elevate your feet when toileting to bring your knees in line with your hips using a small stool or Squatty Potty. Review your medication list and discuss with your PCP whether any of these medications may exacerbate constipation. Try warm sitz baths 2-3 times daily for acute hemorrhoidal flare-up. Apply topical or suppository steroid (such as Preparation H or Anusol) for no longer than 7 days. You can also try lidocaine creams (Preparation H + Lidocaine) or Recticare 5% for pain relief. Try witch hazel pads for itching or burning. Keep the anal area clean and dry; avoid excessive or aggressive wiping. Consider wet wipe usage as needed for hygiene and comfort.       For reflux:   Continue Omeprazole 40 mg daily. Please stop 2 weeks prior to submitting stool sample for H.pylori testing. Stop taking on 8/6/24 for stool sample submission on 8/20/24.    Eat smaller and more frequent meals. Avoid lying down for 3 hours after eating. Elevate the head of the bed by 6 inches. Avoid wearing tight-fitting clothing. Stop smoking and avoid alcohol. Avoid NSAID medications (Aspirin, Excedrin, Aleve, Ibuprofen, Goody Powder, Toradol, Mobic, Voltaren, etc). Consider weight loss to get to a healthy weight, which is often critical to eliminating symptoms of reflux. Avoid foods and acid-containing beverages that can exacerbate the symptoms of reflux. This

## 2024-06-24 NOTE — PROGRESS NOTES
bloating     BRBPR (bright red blood per rectum)     Epigastric pain        Past Surgical History:   Procedure Laterality Date    COLONOSCOPY N/A 5/13/2024    COLONOSCOPY POLYPECTOMY REMOVAL SNARE performed by Bull West MD at [facility] ENDOSCOPY    REFRACTIVE SURGERY      UPPER GASTROINTESTINAL ENDOSCOPY N/A 5/13/2024    ESOPHAGOGASTRODUODENOSCOPY BIOPSY performed by Bull West MD at [facility] ENDOSCOPY        No Known Allergies     Family History   Problem Relation Age of Onset    Heart Disease Father     Diabetes Father     Asthma Father     Hypertension Father        Current Outpatient Medications   Medication Sig Dispense Refill    omeprazole (PRILOSEC) 40 MG delayed release capsule Take 1 capsule by mouth every morning (before breakfast) 90 capsule 2    vitamin C (ASCORBIC ACID) 500 MG tablet Take 1 tablet by mouth daily      VITAMIN D PO Take by mouth daily      omeprazole (PRILOSEC) 40 MG delayed release capsule Take 1 capsule by mouth 2 times daily (before meals) for 10 days 20 capsule 0     No current facility-administered medications for this visit.       Review of Systems  All other systems reviewed and are negative except as noted above.          Objective     Vitals:    06/24/24 1059   BP: 105/68   Pulse: 69   Resp: 17   SpO2: 98%       Physical Exam  Vitals reviewed.   Constitutional:       General: He is not in acute distress.     Appearance: He is normal weight. He is not ill-appearing, toxic-appearing or diaphoretic.   Eyes:      General: No scleral icterus.  Cardiovascular:      Rate and Rhythm: Normal rate.   Pulmonary:      Effort: Pulmonary effort is normal. No respiratory distress.   Skin:     General: Skin is warm and dry.      Coloration: Skin is not jaundiced.   Neurological:      General: No focal deficit present.      Mental Status: He is alert and oriented to person, place, and time.   Psychiatric:         Mood and Affect: Mood normal.         Behavior: Behavior normal.         Thought

## 2024-08-23 ENCOUNTER — TELEPHONE (OUTPATIENT)
Age: 34
End: 2024-08-23

## 2024-08-23 NOTE — TELEPHONE ENCOUNTER
----- Message from Melissa Grinnell, PA-C sent at 8/23/2024  1:25 PM EDT -----  Please call to remind patient to submit stool sample for H.pylori testing to confirm eradication. Patient needs to hold PPI x 2 weeks before submitting stool sample. Please stress the importance of eradication as persistent infection is associated with acid reflux, ulcers, and increased risk of gastric cancer.

## 2024-08-23 NOTE — TELEPHONE ENCOUNTER
I called and reviewed recommendation with Mao Perkins wife as per Melissa Grinnell,PA  Reviewed PPI medication to hold x 2 weeks before submitting stool sample.  Discussed what is associated with acid reflex.   Mr. Cavanaugh will be going downtown/out patient center to drop off his stool.

## 2024-09-09 DIAGNOSIS — A04.8 H. PYLORI INFECTION: ICD-10-CM

## 2024-09-12 LAB
H PYLORI AG STL QL IA: NEGATIVE
SPECIMEN SOURCE: NORMAL

## 2024-09-16 ENCOUNTER — TELEPHONE (OUTPATIENT)
Age: 34
End: 2024-09-16

## 2025-01-09 ENCOUNTER — LAB (OUTPATIENT)
Dept: FAMILY MEDICINE CLINIC | Facility: CLINIC | Age: 35
End: 2025-01-09

## 2025-01-09 ENCOUNTER — OFFICE VISIT (OUTPATIENT)
Dept: FAMILY MEDICINE CLINIC | Facility: CLINIC | Age: 35
End: 2025-01-09
Payer: COMMERCIAL

## 2025-01-09 VITALS
BODY MASS INDEX: 27.49 KG/M2 | SYSTOLIC BLOOD PRESSURE: 109 MMHG | WEIGHT: 192 LBS | TEMPERATURE: 98.6 F | HEART RATE: 77 BPM | DIASTOLIC BLOOD PRESSURE: 69 MMHG | HEIGHT: 70 IN

## 2025-01-09 DIAGNOSIS — Z00.00 ANNUAL PHYSICAL EXAM: ICD-10-CM

## 2025-01-09 DIAGNOSIS — Z00.00 ANNUAL PHYSICAL EXAM: Primary | ICD-10-CM

## 2025-01-09 DIAGNOSIS — G89.29 CHRONIC PAIN OF RIGHT KNEE: ICD-10-CM

## 2025-01-09 DIAGNOSIS — M25.561 CHRONIC PAIN OF RIGHT KNEE: ICD-10-CM

## 2025-01-09 LAB
ALBUMIN SERPL-MCNC: 4.1 G/DL (ref 3.5–5)
ALBUMIN/GLOB SERPL: 1.2 (ref 1–1.9)
ALP SERPL-CCNC: 50 U/L (ref 40–129)
ALT SERPL-CCNC: 27 U/L (ref 8–55)
ANION GAP SERPL CALC-SCNC: 10 MMOL/L (ref 7–16)
AST SERPL-CCNC: 24 U/L (ref 15–37)
BILIRUB SERPL-MCNC: 0.6 MG/DL (ref 0–1.2)
BUN SERPL-MCNC: 17 MG/DL (ref 6–23)
CALCIUM SERPL-MCNC: 9.5 MG/DL (ref 8.8–10.2)
CHLORIDE SERPL-SCNC: 103 MMOL/L (ref 98–107)
CHOLEST SERPL-MCNC: 184 MG/DL (ref 0–200)
CO2 SERPL-SCNC: 26 MMOL/L (ref 20–29)
CREAT SERPL-MCNC: 0.92 MG/DL (ref 0.8–1.3)
GLOBULIN SER CALC-MCNC: 3.5 G/DL (ref 2.3–3.5)
GLUCOSE SERPL-MCNC: 97 MG/DL (ref 70–99)
HDLC SERPL-MCNC: 48 MG/DL (ref 40–60)
HDLC SERPL: 3.8 (ref 0–5)
LDLC SERPL CALC-MCNC: 122 MG/DL (ref 0–100)
POTASSIUM SERPL-SCNC: 4.3 MMOL/L (ref 3.5–5.1)
PROT SERPL-MCNC: 7.6 G/DL (ref 6.3–8.2)
SODIUM SERPL-SCNC: 139 MMOL/L (ref 136–145)
TRIGL SERPL-MCNC: 71 MG/DL (ref 0–150)
VLDLC SERPL CALC-MCNC: 14 MG/DL (ref 6–23)

## 2025-01-09 PROCEDURE — 99395 PREV VISIT EST AGE 18-39: CPT | Performed by: NURSE PRACTITIONER

## 2025-01-09 SDOH — ECONOMIC STABILITY: FOOD INSECURITY: WITHIN THE PAST 12 MONTHS, THE FOOD YOU BOUGHT JUST DIDN'T LAST AND YOU DIDN'T HAVE MONEY TO GET MORE.: NEVER TRUE

## 2025-01-09 SDOH — HEALTH STABILITY: PHYSICAL HEALTH: ON AVERAGE, HOW MANY DAYS PER WEEK DO YOU ENGAGE IN MODERATE TO STRENUOUS EXERCISE (LIKE A BRISK WALK)?: 3 DAYS

## 2025-01-09 SDOH — ECONOMIC STABILITY: FOOD INSECURITY: WITHIN THE PAST 12 MONTHS, YOU WORRIED THAT YOUR FOOD WOULD RUN OUT BEFORE YOU GOT MONEY TO BUY MORE.: NEVER TRUE

## 2025-01-09 SDOH — HEALTH STABILITY: PHYSICAL HEALTH: ON AVERAGE, HOW MANY MINUTES DO YOU ENGAGE IN EXERCISE AT THIS LEVEL?: 60 MIN

## 2025-01-09 ASSESSMENT — ENCOUNTER SYMPTOMS
COUGH: 0
BACK PAIN: 0
NAUSEA: 0
EYE REDNESS: 0
RECTAL PAIN: 0
ABDOMINAL PAIN: 0
SINUS PAIN: 0
CHOKING: 0
RHINORRHEA: 0
SINUS PRESSURE: 0
TROUBLE SWALLOWING: 0
PHOTOPHOBIA: 0
VOICE CHANGE: 0
CONSTIPATION: 0
DIARRHEA: 0
SHORTNESS OF BREATH: 0
VOMITING: 0
ABDOMINAL DISTENTION: 0
BLOOD IN STOOL: 0
EYE PAIN: 0
WHEEZING: 0
SORE THROAT: 0
CHEST TIGHTNESS: 0
ANAL BLEEDING: 0

## 2025-01-09 ASSESSMENT — SOCIAL DETERMINANTS OF HEALTH (SDOH)
WITHIN THE LAST YEAR, HAVE YOU BEEN AFRAID OF YOUR PARTNER OR EX-PARTNER?: NO
WITHIN THE LAST YEAR, HAVE YOU BEEN KICKED, HIT, SLAPPED, OR OTHERWISE PHYSICALLY HURT BY YOUR PARTNER OR EX-PARTNER?: NO
HOW OFTEN DO YOU GET TOGETHER WITH FRIENDS OR RELATIVES?: TWICE A WEEK
HOW OFTEN DO YOU ATTENT MEETINGS OF THE CLUB OR ORGANIZATION YOU BELONG TO?: MORE THAN 4 TIMES PER YEAR
IN A TYPICAL WEEK, HOW MANY TIMES DO YOU TALK ON THE PHONE WITH FAMILY, FRIENDS, OR NEIGHBORS?: MORE THAN THREE TIMES A WEEK
HOW OFTEN DO YOU ATTEND CHURCH OR RELIGIOUS SERVICES?: NEVER
WITHIN THE LAST YEAR, HAVE TO BEEN RAPED OR FORCED TO HAVE ANY KIND OF SEXUAL ACTIVITY BY YOUR PARTNER OR EX-PARTNER?: NO
WITHIN THE LAST YEAR, HAVE YOU BEEN HUMILIATED OR EMOTIONALLY ABUSED IN OTHER WAYS BY YOUR PARTNER OR EX-PARTNER?: NO
DO YOU BELONG TO ANY CLUBS OR ORGANIZATIONS SUCH AS CHURCH GROUPS UNIONS, FRATERNAL OR ATHLETIC GROUPS, OR SCHOOL GROUPS?: YES
HOW HARD IS IT FOR YOU TO PAY FOR THE VERY BASICS LIKE FOOD, HOUSING, MEDICAL CARE, AND HEATING?: NOT HARD AT ALL

## 2025-01-09 ASSESSMENT — PATIENT HEALTH QUESTIONNAIRE - PHQ9
2. FEELING DOWN, DEPRESSED OR HOPELESS: NOT AT ALL
SUM OF ALL RESPONSES TO PHQ QUESTIONS 1-9: 0
SUM OF ALL RESPONSES TO PHQ9 QUESTIONS 1 & 2: 0
1. LITTLE INTEREST OR PLEASURE IN DOING THINGS: NOT AT ALL

## 2025-01-09 ASSESSMENT — LIFESTYLE VARIABLES
HOW MANY STANDARD DRINKS CONTAINING ALCOHOL DO YOU HAVE ON A TYPICAL DAY: 1 OR 2
HOW OFTEN DO YOU HAVE A DRINK CONTAINING ALCOHOL: 2-4 TIMES A MONTH

## 2025-01-09 NOTE — PROGRESS NOTES
Mao Perkins (:  1990) is a 34 y.o. male,Established patient, here for evaluation of the following chief complaint(s):  Annual Exam (F4 Physical/labs) and Knee Problem (Pt is having problems with his right knee, standing for long periods of time-has pressure/pain-about 3 months )         Assessment & Plan  Annual physical exam       Orders:    Comprehensive Metabolic Panel; Future    Lipid Panel; Future    Chronic pain of right knee       Orders:    [facility]      Return in about 1 year (around 2026) for CPE w/fasting labs.       Subjective   Knee Problem  Associated symptoms include arthralgias (right knee paini). Pertinent negatives include no abdominal pain, chest pain, chills, congestion, coughing, diaphoresis, fatigue, fever, headaches, joint swelling, nausea, neck pain, numbness, rash, sore throat, vomiting or weakness.    Here for cpx but also a problem   Right knee pain worse with standing can not run anymore and it pops frequently  Always hurts. At times feels locked in place.  It does not swell or get red.  No specific injury not sure  He eats a healthy diet  He is active and exercises  He is wears seat belt routinely  He is  not up to date with dental exam nor eye exam  He is wearing sun screen with sun exposure.    He declines flu and covid booster  Review of Systems   Constitutional:  Negative for activity change, appetite change, chills, diaphoresis, fatigue, fever and unexpected weight change.   HENT:  Negative for congestion, dental problem, ear pain, hearing loss, mouth sores, nosebleeds, rhinorrhea, sinus pressure, sinus pain, sneezing, sore throat, tinnitus, trouble swallowing and voice change.    Eyes:  Negative for photophobia, pain and redness.   Respiratory:  Negative for cough, choking, chest tightness, shortness of breath and wheezing.    Cardiovascular:  Negative for chest pain, palpitations and leg swelling.   Gastrointestinal:  Negative for

## 2025-01-10 ENCOUNTER — TELEPHONE (OUTPATIENT)
Dept: FAMILY MEDICINE CLINIC | Facility: CLINIC | Age: 35
End: 2025-01-10

## 2025-01-10 NOTE — TELEPHONE ENCOUNTER
----- Message from LAURA Kay NP sent at 1/10/2025  7:20 AM EST -----  Ldl or bad cholesterol high work to improve diet and exercise

## 2025-01-30 ENCOUNTER — OFFICE VISIT (OUTPATIENT)
Dept: ORTHOPEDIC SURGERY | Age: 35
End: 2025-01-30

## 2025-01-30 VITALS — WEIGHT: 185 LBS | HEIGHT: 70 IN | BODY MASS INDEX: 26.48 KG/M2

## 2025-01-30 DIAGNOSIS — M25.561 RIGHT KNEE PAIN, UNSPECIFIED CHRONICITY: ICD-10-CM

## 2025-01-30 DIAGNOSIS — M67.51 PLICA SYNDROME OF KNEE, RIGHT: Primary | ICD-10-CM

## 2025-01-30 DIAGNOSIS — M25.569 KNEE PAIN, UNSPECIFIED CHRONICITY, UNSPECIFIED LATERALITY: ICD-10-CM

## 2025-01-30 PROCEDURE — 99203 OFFICE O/P NEW LOW 30 MIN: CPT | Performed by: PHYSICIAN ASSISTANT

## 2025-01-30 NOTE — PROGRESS NOTES
[facility]          Patient ID:  Name: Mao Perkins  AGE/Gender: 34 y.o. male  MRN: [id number]  : 1990    Date of Consultation:  2025          ALLERGIES: No Known Allergies     CC:  right Knee Pain    History: The patient presents today as a new patient complaining of   right knee pain.  This started 1 year ago. This has progressing worsen and inhibiting his ability to play basketball. No injuries. Describes this as pressure and localizes this to the patella. . They describe the pain as a general ache with periods of sharp pains. They have pain with prolonged standing and loading the knee with bending. They rate the pain as a 0 at rest but 5 out of 10 at times.  The patient is limited in their activities because of this pain.   The patient has tried a knee brace.  They have no other complaints or concerns.    Review of Systems:  Pertinent items are noted in HPI.    Past Medical History Includes:   Past Medical History:   Diagnosis Date    Abdominal bloating     BRBPR (bright red blood per rectum)     Epigastric pain    ,   Past Surgical History:   Procedure Laterality Date    COLONOSCOPY N/A 2024    COLONOSCOPY POLYPECTOMY REMOVAL SNARE performed by Bull West MD at [facility] ENDOSCOPY    REFRACTIVE SURGERY      UPPER GASTROINTESTINAL ENDOSCOPY N/A 2024    ESOPHAGOGASTRODUODENOSCOPY BIOPSY performed by Bull West MD at [facility] ENDOSCOPY       Family History:   Family History   Problem Relation Age of Onset    Heart Disease Father     Diabetes Father     Asthma Father     Hypertension Father         Social History:   Social History     Tobacco Use    Smoking status: Some Days     Current packs/day: 0.25     Average packs/day: 0.3 packs/day for 15.1 years (3.8 ttl pk-yrs)     Types: Cigarettes     Start date: 2010    Smokeless tobacco: Never   Substance Use Topics    Alcohol use: Yes     Comment: 2/week         Physical Exam:     General:  On exam the patient is a pleasant 34

## 2025-02-04 ENCOUNTER — TELEPHONE (OUTPATIENT)
Dept: ORTHOPEDIC SURGERY | Age: 35
End: 2025-02-04

## 2025-02-10 ENCOUNTER — HOSPITAL ENCOUNTER (OUTPATIENT)
Age: 35
Discharge: HOME | End: 2025-02-12
Payer: COMMERCIAL

## 2025-02-10 DIAGNOSIS — M25.561 RIGHT KNEE PAIN, UNSPECIFIED CHRONICITY: ICD-10-CM

## 2025-02-10 DIAGNOSIS — M67.51 PLICA SYNDROME OF KNEE, RIGHT: ICD-10-CM

## 2025-02-10 PROCEDURE — 73721 MRI JNT OF LWR EXTRE W/O DYE: CPT

## 2025-03-05 ENCOUNTER — OFFICE VISIT (OUTPATIENT)
Dept: ORTHOPEDIC SURGERY | Age: 35
End: 2025-03-05
Payer: COMMERCIAL

## 2025-03-05 DIAGNOSIS — M76.899 TENDINOSIS OF QUADRICEPS TENDON: Primary | ICD-10-CM

## 2025-03-05 PROCEDURE — 99212 OFFICE O/P EST SF 10 MIN: CPT | Performed by: PHYSICIAN ASSISTANT

## 2025-03-05 NOTE — PROGRESS NOTES
[facility]          Patient ID:  Name: Mao Perkins  AGE/Gender: 35 y.o. male  MRN: [id number]  : 1990    Date of Consultation:  2025          ALLERGIES: No Known Allergies     CC: Recheck right knee    History:  The patient presents today for recheck of recheck of the right knee.  He is here for discussion of MRI results.  His initial injury was over a year ago but since then has continued to have pain.  MRI scan demonstrated quadriceps tendinosis.  This has not improved.  He says he can notice that the left knee is starting to do the same thing.  They have no other complaints or concerns.      Review of Systems:  Pertinent items are noted in HPI.    Past Medical History Includes:   Past Medical History:   Diagnosis Date    Abdominal bloating     BRBPR (bright red blood per rectum)     Epigastric pain    ,   Past Surgical History:   Procedure Laterality Date    COLONOSCOPY N/A 2024    COLONOSCOPY POLYPECTOMY REMOVAL SNARE performed by Bull West MD at [facility] ENDOSCOPY    REFRACTIVE SURGERY      UPPER GASTROINTESTINAL ENDOSCOPY N/A 2024    ESOPHAGOGASTRODUODENOSCOPY BIOPSY performed by Bull West MD at [facility] ENDOSCOPY       Family History:   Family History   Problem Relation Age of Onset    Heart Disease Father     Diabetes Father     Asthma Father     Hypertension Father         Social History:   Social History     Tobacco Use    Smoking status: Some Days     Current packs/day: 0.25     Average packs/day: 0.3 packs/day for 15.2 years (3.8 ttl pk-yrs)     Types: Cigarettes     Start date: 2010    Smokeless tobacco: Never   Substance Use Topics    Alcohol use: Yes     Comment: 2/week         Physical Exam:     General:  On exam the patient is a pleasant 35 y.o. male in no acute distress, A&O x 3. Ambulates without an antalgic gait.  HEENT: NC/AT, PERRL   Psych: normal mood, normal affect  Lungs: respirations even, normal breath sounds  MSK: On exam the patient has 5 out

## 2025-04-30 ENCOUNTER — OFFICE VISIT (OUTPATIENT)
Dept: ORTHOPEDIC SURGERY | Age: 35
End: 2025-04-30
Payer: COMMERCIAL

## 2025-04-30 DIAGNOSIS — M76.899 TENDINOSIS OF QUADRICEPS TENDON: Primary | ICD-10-CM

## 2025-04-30 PROCEDURE — 99212 OFFICE O/P EST SF 10 MIN: CPT | Performed by: PHYSICIAN ASSISTANT

## 2025-04-30 NOTE — PROGRESS NOTES
[facility]          Patient ID:  Name: Mao Perkins  AGE/Gender: 35 y.o. male  MRN: [id number]  : 1990    Date of Consultation:  2025          ALLERGIES: No Known Allergies     CC: Recheck right knee    History:  The patient presents today for recheck of the right knee. The patient presents today for recheck of the right knee.  He was found to have quadriceps tendinosis of the right knee.  He was referred to physical therapy but he has been busy moving and has not been able to start this.  He says that overall the knee pain is improving a lot and the Voltaren gel is helping but he still feels weak with the right knee..       Physical Exam:     General:  On exam the patient is a pleasant 35 y.o. male in no acute distress, A&O x 3.  HEENT: NC/AT, PERRL   Psych: normal mood, normal affect  Lungs: respirations even, normal breath sounds  MSK: Motion of the right knee range of motion is 0 to 130 degrees.  Quad strength 5/5.  Good quad tone.  Negative Homans.  Calf soft nontender  Vascular: No distal edema or clubbing, Distal pulses are intact  Neurologic: Normal.         Assessment:     Quadricep tendinosis right knee      Medical Decision Making and Plan:    Chart reviewed  The patient has been improving some with his right knee.  He will begin physical therapy now that he is almost done moving.  Will give him a home exercise program to start till he can begin formal physical therapy.  Will see on as-needed basis.       Time spent in preparation, chart review, and direct patient care was 10 minutes    Electronically signed by:   AYSE Villatoro  2025,  8:40 AM  '

## 2025-08-06 ENCOUNTER — OFFICE VISIT (OUTPATIENT)
Dept: FAMILY MEDICINE CLINIC | Facility: CLINIC | Age: 35
End: 2025-08-06
Payer: COMMERCIAL

## 2025-08-06 VITALS
TEMPERATURE: 98 F | BODY MASS INDEX: 27.63 KG/M2 | DIASTOLIC BLOOD PRESSURE: 74 MMHG | HEART RATE: 64 BPM | SYSTOLIC BLOOD PRESSURE: 115 MMHG | WEIGHT: 193 LBS | HEIGHT: 70 IN

## 2025-08-06 DIAGNOSIS — L50.9 URTICARIA: Primary | ICD-10-CM

## 2025-08-06 PROCEDURE — 99213 OFFICE O/P EST LOW 20 MIN: CPT | Performed by: NURSE PRACTITIONER

## 2025-08-06 RX ORDER — CETIRIZINE HYDROCHLORIDE 10 MG/1
10 TABLET ORAL DAILY
Qty: 90 TABLET | Refills: 0 | Status: SHIPPED | OUTPATIENT
Start: 2025-08-06

## 2025-08-06 RX ORDER — FAMOTIDINE 20 MG/1
20 TABLET, FILM COATED ORAL 2 TIMES DAILY
Qty: 60 TABLET | Refills: 3 | Status: SHIPPED | OUTPATIENT
Start: 2025-08-06

## (undated) DEVICE — SYRINGE, LUER SLIP, STERILE, 60ML: Brand: MEDLINE

## (undated) DEVICE — BLOCK BITE AD 60FR W/ VELC STRP ADDRESSES MOST PT AND

## (undated) DEVICE — LUBE JELLY FOIL PACK 1.4 OZ: Brand: MEDLINE INDUSTRIES, INC.

## (undated) DEVICE — SINGLE PORT MANIFOLD: Brand: NEPTUNE 2

## (undated) DEVICE — AIRLIFE™ OXYGEN TUBING 7 FEET (2.1 M) CRUSH RESISTANT OXYGEN TUBING, VINYL TIPPED: Brand: AIRLIFE™

## (undated) DEVICE — CONNECTOR TBNG OD5-7MM O2 END DISP

## (undated) DEVICE — NEEDLE SYR 18GA L1.5IN RED PLAS HUB S STL BLNT FILL W/O

## (undated) DEVICE — FORCEPS BX L240CM JAW DIA2.8MM L CAP W/ NDL MIC MESH TOOTH

## (undated) DEVICE — CANNULA NSL ORAL AD FOR CAPNOFLEX CO2 O2 AIRLFE

## (undated) DEVICE — ENDOSCOPIC KIT 1.1+ OP4 CA DE 2 GWN AAMI LEVEL 3

## (undated) DEVICE — TRAP SPEC POLYP REM STRNR CLN DSGN MAGNIFYING WIND DISP

## (undated) DEVICE — KENDALL RADIOLUCENT FOAM MONITORING ELECTRODE RECTANGULAR SHAPE: Brand: KENDALL

## (undated) DEVICE — GAUZE,SPONGE,4"X4",12PLY,WOVEN,NS,LF: Brand: MEDLINE

## (undated) DEVICE — SYRINGE MED 10ML LUERLOCK TIP W/O SFTY DISP

## (undated) DEVICE — SNARE VASC L240CM LOOP W10MM SHTH DIA2.4MM RND STIFF CLD

## (undated) DEVICE — CONTAINER FORMALIN PREFILLED 10% NBF 60ML

## (undated) DEVICE — MOUTHPIECE ENDOSCP L CTRL OPN AND SIDE PORTS DISP

## (undated) DEVICE — SYRINGE MEDICAL 3ML CLEAR PLASTIC STANDARD NON CONTROL LUERLOCK TIP DISPOSABLE